# Patient Record
Sex: FEMALE
[De-identification: names, ages, dates, MRNs, and addresses within clinical notes are randomized per-mention and may not be internally consistent; named-entity substitution may affect disease eponyms.]

---

## 2019-07-08 NOTE — ANESTHESIA CONSULTATION
Anesthesia Consult and Med Hx


Date of service: 07/10/19





- Airway


Anesthetic Teeth Evaluation: Chipped


ROM Head & Neck: Inadequate


Mental/Hyoid Distance: Adequate


Mallampati Class: Class II


Intubation Access Assessment: Difficult





- Pre-Operative Health Status


ASA Pre-Surgery Classification: ASA3


Proposed Anesthetic Plan: General


Nerve Block: PEC





- Pulmonary


Hx Smoking: No


Hx Sleep Apnea: No (SUHA PRE SCREEN LOW RISK.)





- Cardiovascular System


Hx Hypertension: Yes (X 5 YRS. Limited activity because of neck pain)


Hx Coronary Artery Disease: No (Had ECHO/NST [id number] per pt)





- Central Nervous System


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


Hx Back Pain: Yes (C7 FX. NECK AND BACK PAIN FRON CANCER)





- Additional Comments


Anesthesia Medical History Comments: NECK-s/p XRT to C-Spine and not a candidate

for kyphoplasty. C7 compression fracture.  C6-T1 severe canal stenosis from mets

## 2019-07-10 ENCOUNTER — HOSPITAL ENCOUNTER (OUTPATIENT)
Dept: HOSPITAL 5 - OR | Age: 64
Setting detail: OBSERVATION
LOS: 1 days | Discharge: HOME | End: 2019-07-11
Attending: SURGERY | Admitting: SURGERY
Payer: MEDICARE

## 2019-07-10 DIAGNOSIS — C50.212: Primary | ICD-10-CM

## 2019-07-10 DIAGNOSIS — C50.412: ICD-10-CM

## 2019-07-10 DIAGNOSIS — I10: ICD-10-CM

## 2019-07-10 DIAGNOSIS — Z79.899: ICD-10-CM

## 2019-07-10 DIAGNOSIS — C50.812: ICD-10-CM

## 2019-07-10 LAB
ANISOCYTOSIS BLD QL SMEAR: (no result)
BAND NEUTROPHILS # (MANUAL): 0 K/MM3
HCT VFR BLD CALC: 36.1 % (ref 30.3–42.9)
HGB BLD-MCNC: 12.6 GM/DL (ref 10.1–14.3)
MCHC RBC AUTO-ENTMCNC: 35 % (ref 30–34)
MCV RBC AUTO: 100 FL (ref 79–97)
MYELOCYTES # (MANUAL): 0 K/MM3
PLATELET # BLD: 188 K/MM3 (ref 140–440)
POIKILOCYTOSIS BLD QL SMEAR: (no result)
PROMYELOCYTES # (MANUAL): 0 K/MM3
RBC # BLD AUTO: 3.62 M/MM3 (ref 3.65–5.03)
TOTAL CELLS COUNTED BLD: 100

## 2019-07-10 PROCEDURE — 88305 TISSUE EXAM BY PATHOLOGIST: CPT

## 2019-07-10 PROCEDURE — 78800 RP LOCLZJ TUM 1 AREA 1 D IMG: CPT

## 2019-07-10 PROCEDURE — 36415 COLL VENOUS BLD VENIPUNCTURE: CPT

## 2019-07-10 PROCEDURE — 38525 BIOPSY/REMOVAL LYMPH NODES: CPT

## 2019-07-10 PROCEDURE — 88307 TISSUE EXAM BY PATHOLOGIST: CPT

## 2019-07-10 PROCEDURE — 19303 MAST SIMPLE COMPLETE: CPT

## 2019-07-10 PROCEDURE — 64450 NJX AA&/STRD OTHER PN/BRANCH: CPT

## 2019-07-10 PROCEDURE — 85025 COMPLETE CBC W/AUTO DIFF WBC: CPT

## 2019-07-10 PROCEDURE — A9541 TC99M SULFUR COLLOID: HCPCS

## 2019-07-10 PROCEDURE — 38792 RA TRACER ID OF SENTINL NODE: CPT

## 2019-07-10 PROCEDURE — 88341 IMHCHEM/IMCYTCHM EA ADD ANTB: CPT

## 2019-07-10 PROCEDURE — 88368 INSITU HYBRIDIZATION MANUAL: CPT

## 2019-07-10 PROCEDURE — 88309 TISSUE EXAM BY PATHOLOGIST: CPT

## 2019-07-10 PROCEDURE — 88304 TISSUE EXAM BY PATHOLOGIST: CPT

## 2019-07-10 PROCEDURE — 88342 IMHCHEM/IMCYTCHM 1ST ANTB: CPT

## 2019-07-10 PROCEDURE — 85007 BL SMEAR W/DIFF WBC COUNT: CPT

## 2019-07-10 PROCEDURE — 76098 X-RAY EXAM SURGICAL SPECIMEN: CPT

## 2019-07-10 PROCEDURE — G0378 HOSPITAL OBSERVATION PER HR: HCPCS

## 2019-07-10 RX ADMIN — OXYCODONE AND ACETAMINOPHEN PRN TAB: 5; 325 TABLET ORAL at 17:38

## 2019-07-10 NOTE — XRAY REPORT
LEFT breast tissue specimen radiograph.



History: LEFT BREAST CANCER



Comparison: None.



Findings:  The specimen radiograph shows an irregular mass and extensive suspicious calcifications. 2
 localizer clips are identified within the specimen.



Signer Name: Pedro Guzman MD 

Signed: 7/10/2019 11:50 AM

 Workstation Name: [id number]

## 2019-07-10 NOTE — OPERATIVE REPORT
Operative Report


Operative Report: 





Operative Report: 





Date of Service: July 10, 2019





Preoperative diagnosis: Left breast cancer of the upper inner/upper outer 

quadrants, overlapping areas





Postoperative diagnosis: Same





Procedure: Left total mastectomy with left axillary lymph node excision





Surgeon: Radha Victor M.D.





Assistant: Michaela Del Toro M.D.





Anesthesia: Gen.





Findings: Left breast clip present within left total mastectomy.  2 axillary 

lymph nodes excised. Known left breast cancer of the upper inner and upper outer

quadrant





Complications: None





Drains: 19 Fr LAM





Estimated blood loss:  cc





Disposition: PACU in good condition





Indications for operative procedure: This is a 64-year-old lady with Stage IV 

left breast cancer of the upper inner/outer quadrant, IDCA grade 3 [id number] 

(skeletal metastasis) ER/TX positive. She recently completed neoadjvuant 

chemotherapy of immunotherapy. Recent PET with significant areas of improvement 

with good response to immunotherapy. Recommendations were to proceed with a 

mastectomy to decrease her disease burden. She understood that any bulky 

lymphadenopathy seen at surgery would be removed. She understood that surgery 

was not for cure. Genetic testing with no significant gene mutation. She wished 

to proceed with the above procedure.





Procedure in detail: Anesthesia placed left pectoral muscle block prior to going

back to the operating room. The patient was taken to the operating room and was 

placed supine. Gen. anesthesia was administered. Patient with cervical 

involvement and neck was left immobilized and protected. The left nipple was 

injected with radioisotope and 1 cc of methylene blue. Left chest and axilla was

prepped and draped in the normal sterile operative fashion. Left arm with 

limited movement and unable to be abducted given history of disease. Timeout was

performed. Typical mastectomy incision marking was made that included the area 

of known malignancy.





Attention was taken towards the left breast. A skin incision was made with a 10 

blade knife and dissection taken down to the subcutaneous tissues. First began 

raising of the superior flap to the level of the clavicle superiorly and 

posteriorly to the pectoralis muscle. Known cancer palpable from the 11:00 to 

2:00 positions. Followed by raising of the medial flap to the level of the 

sternum and posteriorly to the pectoralis muscle. Followed by raising of the 

lateral flap to the level of the latissimus dorsi muscle and taken down 

posteriorly.  Then proceeded with raising of the inferior flap to the level of 

the inframammary fold taken posterior to the pectoralis muscle. The 

mastectomy/breast was removed from the pectoralis muscle without incident. The 

specimen was appropriately marked and sent to radiology with findings of  breast

clip present and sent to pathology.





Attention was then taken towards the left axilla. The gamma probe was inserted 

into the axilla with minimal uptake noted. Two axillary lymph nodes were noted 

with one node with blue dye present, nodes were excised. No bulky 

lymphadenopathy was noted. No additional dissection was performed and axilla not

completely visualized given limited mobility of left arm. Further dissection was

not performed given patient with known Stage IV left breast cancer and patient 

currently with left arm lymphedema with limited mobility.





Hemostasis was obtained using the bovie cautery. One 19 Fr LAM drain was placed. 

The subcutaneous tissues were approximated and closed using interrupted 3-0 

Vicryl followed by closing of the skin using running 4-0 Monocryl and dermabond.

She tolerated surgery very well and was awaken from anesthesia and transported 

to PACU in good condition.

## 2019-07-10 NOTE — SHORT STAY SUMMARY
Short Stay Documentation


Date of service: 07/10/19





- History


H&P: obtained from office





- Allergies and Medications


Current Medications: 


                                    Allergies





No Known Allergies Allergy (Verified 07/02/19 16:11)


   





                                Home Medications











 Medication  Instructions  Recorded  Confirmed  Last Taken  Type


 


LORazepam [Ativan] 0.5 mg PO BID 07/02/19 07/02/19 Unknown History


 


Letrozole (Nf) [Femara (Nf)] 2.5 mg PO QDAY 07/02/19 07/02/19 Unknown History


 


Lisinopril [Zestril TAB] 10 mg PO QDAY 07/02/19 07/02/19 Unknown History


 


Ribociclib Succinate [Kisqali] 200 mg PO DAILY 07/02/19 07/02/19 Unknown History


 


oxyCODONE /ACETAMINOPHEN [Percocet 1 tab PO Q6HR PRN 07/02/19 07/02/19 Unknown 

History





5/325]     


 


oxyCODONE ER [oxyCONTIN ER] 10 mg PO Q12HR 07/02/19 07/02/19 Unknown History


 


amLODIPine [Norvasc] 5 mg PO DAILY 07/08/19 07/08/19 Unknown History








Active Medications





Fentanyl (Sublimaze)  50 mcg IV Q5MIN PRN


   PRN Reason: Pain , Severe (7-10)


   Stop: 07/10/19 20:00


Lactated Ringer's (Lactated Ringers)  1,000 mls @ 75 mls/hr IV AS DIRECT RODRIGO


   Last Admin: 07/10/19 08:50 Dose:  75 mls/hr


   Documented by: 


Ondansetron HCl (Zofran)  4 mg IV ONCE PRN


   PRN Reason: Nausea And Vomiting


   Stop: 07/10/19 16:00











- Brief post op/procedure progress note


Date of procedure: 07/10/19


Pre-op diagnosis: Left breast cancer


Post-op diagnosis: same


Procedure: 





Left total mastectomy with axillary lymph node excision


Anesthesia: GETA


Findings: 





Left total mastectomy with 


Surgeon: MARGARITO RAUSCH


Assistant: MARCELO BOUCHER


Estimated blood loss: 50-100ml


Pathology: list (left total mastectomy, left axillary lymph node excision)


Specimen disposition: to lab


Condition: stable





- Disposition


Condition at discharge: Good


Disposition: DC/TX-02 SHRT-TRM GEN HOSP IP





Short Stay Discharge Plan


Activity: other (no heavy lifting)


Diet: regular


Wound: keep clean and dry (may shower in 48 hours; no heavy lifting; wear breast

 binder)


Follow up with: 


KARO RAIN [Other] - 7 Days


MARGARITO RAUSCH MD [Staff Physician] - 7 Days

## 2019-07-10 NOTE — POST ANESTHESIA EVALUATION
- Post Anesthesia Evaluation


Patient Participated: Yes (mentation at preop baseline)


Airway Patent: Yes


Stable Respiratory Function: Yes


Nausea/Vomiting: No


Temp > 96.8F: Yes


Pain Manageable: Yes


Adequeate Hydration: Yes


Anesthesia Complications: No


Block Receding Appropriately: Not Applicable (block for post op analgesia)


Patient on Ventilator: No


Other Comments: Neuro exam unchanged from preop baseline.

## 2019-07-11 VITALS — DIASTOLIC BLOOD PRESSURE: 67 MMHG | SYSTOLIC BLOOD PRESSURE: 122 MMHG

## 2019-07-11 RX ADMIN — OXYCODONE AND ACETAMINOPHEN PRN TAB: 5; 325 TABLET ORAL at 02:55

## 2019-07-11 RX ADMIN — OXYCODONE AND ACETAMINOPHEN PRN TAB: 5; 325 TABLET ORAL at 10:13

## 2019-07-11 NOTE — PROGRESS NOTE
Assessment and Plan





This is a 64 year old lady with Stage IV left breast cancer, proceeded with left

total mastectomy with axillary lymph node excision to decrease disease burden-

POD#1.





1. No acute events overnight.


2. Pain in good control.


3. Left chest incision healing well.


4. LAM drain education.


5. D/C planning for today.





Subjective


Date of service: 07/11/19


Principal diagnosis: Left breast cancer, Stage IV


Interval history: 





POD# left total mastectomy and left axillary lymph node excision





Objective





- Constitutional


Vitals: 


                               Vital Signs - 12hr











  07/10/19 07/11/19 07/11/19





  19:46 00:29 00:32


 


Temperature 98.0 F 98.0 F 98.6 F


 


Pulse Rate 102 H 86 


 


Respiratory 18 18 18





Rate   


 


Blood Pressure 140/86 117/70 139/77


 


Blood Pressure   





[Left]   


 


O2 Sat by Pulse 97 96 





Oximetry   














  07/11/19





  04:00


 


Temperature 98.6 F


 


Pulse Rate 62


 


Respiratory 18





Rate 


 


Blood Pressure 


 


Blood Pressure 118/64





[Left] 


 


O2 Sat by Pulse 





Oximetry 











General appearance: Present: no acute distress





- EENT


Eyes: PERRL, EOM intact


ENT: hearing intact, clear oral mucosa, dentition normal


Ears: bilateral: normal





- Neck


Neck: other (Cervical collar in place-compression fractures)





- Respiratory


Respiratory: bilateral: CTA





- Breasts


Breasts: other (left chest incision healing well; c/d/i; no hematoma; LAM drain 

bulb suction)





- Cardiovascular


Rhythm: regular


Extremities: no ischemia, pulses intact, pulses symmetrical, No edema, normal 

temperature, normal color





- Gastrointestinal


General gastrointestinal: Present: soft, non-tender, non-distended


Rectal Exam: deferred





- Genitourinary


Female genitourinary: deferred





- Integumentary


Integumentary: clear, warm, dry





- Musculoskeletal


Musculoskeletal: left sided weakness





- Neurologic


Neurologic: focal deficits (stable exam, known metastatic disease to cervical 

and thoracic spine)





- Psychiatric


Psychiatric: appropriate mood/affect, intact judgment & insight, memory intact, 

cooperative





- Labs


CBC & Chem 7: 


                                 07/10/19 06:54





Labs: 


                              Abnormal lab results











  07/10/19 Range/Units





  06:54 


 


Lymphocytes % (Manual)  13.0 L  (13.4-35.0)  %


 


Monocytes % (Manual)  19.0 H  (0.0-7.3)  %


 


Seg Neutrophils # Man  1.7 L  (1.8-7.7)  K/mm3


 


Lymphocytes # (Manual)  0.3 L  (1.2-5.4)  K/mm3














Medications & Allergies





- Medications


Allergies/Adverse Reactions: 


                                    Allergies





No Known Allergies Allergy (Verified 07/02/19 16:11)


   








Home Medications: 


                                Home Medications











 Medication  Instructions  Recorded  Confirmed  Last Taken  Type


 


LORazepam [Ativan] 0.5 mg PO BID 07/02/19 07/10/19 07/10/19 04:00 History





    0.5 


 


Letrozole (Nf) [Femara (Nf)] 2.5 mg PO QDAY 07/02/19 07/10/19 07/09/19 04:00 

History


 


Lisinopril [Zestril TAB] 10 mg PO QDAY 07/02/19 07/10/19 07/09/19 04:00 History


 


Ribociclib Succinate [Kisqali] 200 mg PO DAILY 07/02/19 07/10/19 06/26/19 

History





    200 


 


oxyCODONE /ACETAMINOPHEN [Percocet 1 tab PO Q6HR PRN 07/02/19 07/10/19 07/10/19 

14:00 History





5/325]     


 


oxyCODONE ER [oxyCONTIN ER] 10 mg PO Q12HR 07/02/19 07/10/19 07/10/19 04:00 

History


 


amLODIPine [Norvasc] 5 mg PO DAILY 07/08/19 07/10/19 07/10/19 04:00 History





    5 











Active Medications: 














Generic Name Dose Route Start Last Admin





  Trade Name Freq  PRN Reason Stop Dose Admin


 


Acetaminophen  650 mg  07/10/19 12:52 





  Tylenol  PO  





  Q6H PRN  





  Pain MILD(1-3)/Fever >100.5/HA  


 


Diphenhydramine HCl  25 mg  07/10/19 12:52 





  Benadryl  PO  





  Q8H PRN  





  Itching  


 


Docusate Sodium  100 mg  07/10/19 22:00 





  Colace  PO  





  BID RODRIGO  


 


Lactated Ringer's  1,000 mls @ 75 mls/hr  07/10/19 08:50  07/10/19 08:50





  Lactated Ringers  IV   75 mls/hr





  AS DIRECT RODRIGO   Administration


 


Lactated Ringer's  1,000 mls @ 125 mls/hr  07/10/19 13:00 





  Lactated Ringers  IV  





  AS DIRECT RODRIGO  


 


Metoclopramide HCl  10 mg  07/10/19 12:52 





  Reglan  PO  





  Q6H PRN  





  Nausea And Vomiting  


 


Morphine Sulfate  2 mg  07/10/19 12:56 





  Morphine  IV  





  Q4H PRN  





  Pain, Moderate (4-6)  


 


Ondansetron HCl  4 mg  07/10/19 12:52 





  Zofran  IV  





  Q8H PRN  





  N/V unrelieved by Reglan  


 


Oxycodone/Acetaminophen  1 tab  07/10/19 12:52  07/11/19 02:55





  Percocet 5/325  PO   1 tab





  Q6H PRN   Administration





  Pain, Moderate (4-6)  


 


Sodium Chloride  10 ml  07/10/19 12:52 





  Sodium Chloride Flush Syringe 10 Ml  IV  





  PRN PRN  





  LINE FLUSH